# Patient Record
Sex: FEMALE | Race: BLACK OR AFRICAN AMERICAN | Employment: OTHER | URBAN - METROPOLITAN AREA
[De-identification: names, ages, dates, MRNs, and addresses within clinical notes are randomized per-mention and may not be internally consistent; named-entity substitution may affect disease eponyms.]

---

## 2018-05-10 ENCOUNTER — ANESTHESIA EVENT (OUTPATIENT)
Dept: SURGERY | Age: 73
End: 2018-05-10
Payer: MEDICARE

## 2018-05-11 ENCOUNTER — ANESTHESIA (OUTPATIENT)
Dept: SURGERY | Age: 73
End: 2018-05-11
Payer: MEDICARE

## 2018-05-11 ENCOUNTER — HOSPITAL ENCOUNTER (OUTPATIENT)
Age: 73
Setting detail: OUTPATIENT SURGERY
Discharge: HOME | End: 2018-05-11
Attending: ORTHOPAEDIC SURGERY | Admitting: ORTHOPAEDIC SURGERY
Payer: MEDICARE

## 2018-05-11 VITALS
RESPIRATION RATE: 12 BRPM | HEART RATE: 68 BPM | WEIGHT: 172.38 LBS | SYSTOLIC BLOOD PRESSURE: 139 MMHG | DIASTOLIC BLOOD PRESSURE: 56 MMHG | OXYGEN SATURATION: 96 % | TEMPERATURE: 97.6 F | BODY MASS INDEX: 25.46 KG/M2

## 2018-05-11 DIAGNOSIS — G89.18 POST-OP PAIN: Primary | ICD-10-CM

## 2018-05-11 LAB
GLUCOSE BLD STRIP.AUTO-MCNC: 130 MG/DL (ref 65–100)
POTASSIUM BLD-SCNC: 3.8 MMOL/L (ref 3.5–5.1)

## 2018-05-11 PROCEDURE — 77030006605 HC BLD CRPL IN BIOM -C: Performed by: ORTHOPAEDIC SURGERY

## 2018-05-11 PROCEDURE — 74011000250 HC RX REV CODE- 250: Performed by: ORTHOPAEDIC SURGERY

## 2018-05-11 PROCEDURE — 77030000032 HC CUF TRNQT ZIMM -B: Performed by: ORTHOPAEDIC SURGERY

## 2018-05-11 PROCEDURE — 77030039266 HC ADH SKN EXOFIN S2SG -A: Performed by: ORTHOPAEDIC SURGERY

## 2018-05-11 PROCEDURE — 74011250636 HC RX REV CODE- 250/636: Performed by: ORTHOPAEDIC SURGERY

## 2018-05-11 PROCEDURE — 74011250636 HC RX REV CODE- 250/636

## 2018-05-11 PROCEDURE — 77030011640 HC PAD GRND REM COVD -A: Performed by: ORTHOPAEDIC SURGERY

## 2018-05-11 PROCEDURE — 76210000021 HC REC RM PH II 0.5 TO 1 HR: Performed by: ORTHOPAEDIC SURGERY

## 2018-05-11 PROCEDURE — 77030018836 HC SOL IRR NACL ICUM -A: Performed by: ORTHOPAEDIC SURGERY

## 2018-05-11 PROCEDURE — 74011250637 HC RX REV CODE- 250/637: Performed by: ANESTHESIOLOGY

## 2018-05-11 PROCEDURE — 74011250636 HC RX REV CODE- 250/636: Performed by: ANESTHESIOLOGY

## 2018-05-11 PROCEDURE — 76010000138 HC OR TIME 0.5 TO 1 HR: Performed by: ORTHOPAEDIC SURGERY

## 2018-05-11 PROCEDURE — 82962 GLUCOSE BLOOD TEST: CPT

## 2018-05-11 PROCEDURE — 76060000032 HC ANESTHESIA 0.5 TO 1 HR: Performed by: ORTHOPAEDIC SURGERY

## 2018-05-11 PROCEDURE — 76210000063 HC OR PH I REC FIRST 0.5 HR: Performed by: ORTHOPAEDIC SURGERY

## 2018-05-11 PROCEDURE — 77030031139 HC SUT VCRL2 J&J -A: Performed by: ORTHOPAEDIC SURGERY

## 2018-05-11 PROCEDURE — 84132 ASSAY OF SERUM POTASSIUM: CPT

## 2018-05-11 PROCEDURE — 74011000250 HC RX REV CODE- 250

## 2018-05-11 RX ORDER — PROPOFOL 10 MG/ML
INJECTION, EMULSION INTRAVENOUS
Status: DISCONTINUED | OUTPATIENT
Start: 2018-05-11 | End: 2018-05-11 | Stop reason: HOSPADM

## 2018-05-11 RX ORDER — HYDROMORPHONE HYDROCHLORIDE 2 MG/ML
0.5 INJECTION, SOLUTION INTRAMUSCULAR; INTRAVENOUS; SUBCUTANEOUS
Status: DISCONTINUED | OUTPATIENT
Start: 2018-05-11 | End: 2018-05-11 | Stop reason: HOSPADM

## 2018-05-11 RX ORDER — CEFAZOLIN SODIUM/WATER 2 G/20 ML
2 SYRINGE (ML) INTRAVENOUS ONCE
Status: DISCONTINUED | OUTPATIENT
Start: 2018-05-11 | End: 2018-05-11 | Stop reason: HOSPADM

## 2018-05-11 RX ORDER — SODIUM CHLORIDE, SODIUM LACTATE, POTASSIUM CHLORIDE, CALCIUM CHLORIDE 600; 310; 30; 20 MG/100ML; MG/100ML; MG/100ML; MG/100ML
75 INJECTION, SOLUTION INTRAVENOUS CONTINUOUS
Status: DISCONTINUED | OUTPATIENT
Start: 2018-05-11 | End: 2018-05-11 | Stop reason: HOSPADM

## 2018-05-11 RX ORDER — LIDOCAINE HYDROCHLORIDE AND EPINEPHRINE 10; 10 MG/ML; UG/ML
INJECTION, SOLUTION INFILTRATION; PERINEURAL AS NEEDED
Status: DISCONTINUED | OUTPATIENT
Start: 2018-05-11 | End: 2018-05-11 | Stop reason: HOSPADM

## 2018-05-11 RX ORDER — MIDAZOLAM HYDROCHLORIDE 1 MG/ML
2 INJECTION, SOLUTION INTRAMUSCULAR; INTRAVENOUS
Status: DISCONTINUED | OUTPATIENT
Start: 2018-05-11 | End: 2018-05-11 | Stop reason: HOSPADM

## 2018-05-11 RX ORDER — SODIUM CHLORIDE 0.9 % (FLUSH) 0.9 %
5-10 SYRINGE (ML) INJECTION AS NEEDED
Status: DISCONTINUED | OUTPATIENT
Start: 2018-05-11 | End: 2018-05-11 | Stop reason: HOSPADM

## 2018-05-11 RX ORDER — OXYCODONE AND ACETAMINOPHEN 5; 325 MG/1; MG/1
1 TABLET ORAL AS NEEDED
Status: DISCONTINUED | OUTPATIENT
Start: 2018-05-11 | End: 2018-05-11 | Stop reason: HOSPADM

## 2018-05-11 RX ORDER — FAMOTIDINE 20 MG/1
20 TABLET, FILM COATED ORAL ONCE
Status: COMPLETED | OUTPATIENT
Start: 2018-05-11 | End: 2018-05-11

## 2018-05-11 RX ORDER — LIDOCAINE HYDROCHLORIDE 10 MG/ML
INJECTION INFILTRATION; PERINEURAL AS NEEDED
Status: DISCONTINUED | OUTPATIENT
Start: 2018-05-11 | End: 2018-05-11 | Stop reason: HOSPADM

## 2018-05-11 RX ORDER — LIDOCAINE HYDROCHLORIDE 20 MG/ML
INJECTION, SOLUTION EPIDURAL; INFILTRATION; INTRACAUDAL; PERINEURAL AS NEEDED
Status: DISCONTINUED | OUTPATIENT
Start: 2018-05-11 | End: 2018-05-11 | Stop reason: HOSPADM

## 2018-05-11 RX ORDER — FENTANYL CITRATE 50 UG/ML
INJECTION, SOLUTION INTRAMUSCULAR; INTRAVENOUS AS NEEDED
Status: DISCONTINUED | OUTPATIENT
Start: 2018-05-11 | End: 2018-05-11 | Stop reason: HOSPADM

## 2018-05-11 RX ORDER — LIDOCAINE HYDROCHLORIDE 10 MG/ML
0.1 INJECTION INFILTRATION; PERINEURAL AS NEEDED
Status: DISCONTINUED | OUTPATIENT
Start: 2018-05-11 | End: 2018-05-11 | Stop reason: HOSPADM

## 2018-05-11 RX ORDER — LIDOCAINE HYDROCHLORIDE 5 MG/ML
INJECTION, SOLUTION INFILTRATION; INTRAVENOUS AS NEEDED
Status: DISCONTINUED | OUTPATIENT
Start: 2018-05-11 | End: 2018-05-11 | Stop reason: HOSPADM

## 2018-05-11 RX ORDER — METHYLPREDNISOLONE ACETATE 40 MG/ML
INJECTION, SUSPENSION INTRA-ARTICULAR; INTRALESIONAL; INTRAMUSCULAR; SOFT TISSUE AS NEEDED
Status: DISCONTINUED | OUTPATIENT
Start: 2018-05-11 | End: 2018-05-11 | Stop reason: HOSPADM

## 2018-05-11 RX ORDER — PROPOFOL 10 MG/ML
INJECTION, EMULSION INTRAVENOUS AS NEEDED
Status: DISCONTINUED | OUTPATIENT
Start: 2018-05-11 | End: 2018-05-11 | Stop reason: HOSPADM

## 2018-05-11 RX ORDER — NALOXONE HYDROCHLORIDE 0.4 MG/ML
0.2 INJECTION, SOLUTION INTRAMUSCULAR; INTRAVENOUS; SUBCUTANEOUS AS NEEDED
Status: DISCONTINUED | OUTPATIENT
Start: 2018-05-11 | End: 2018-05-11 | Stop reason: HOSPADM

## 2018-05-11 RX ORDER — HYDROCODONE BITARTRATE AND ACETAMINOPHEN 7.5; 325 MG/1; MG/1
1 TABLET ORAL
Qty: 20 TAB | Refills: 0 | Status: SHIPPED | OUTPATIENT
Start: 2018-05-11

## 2018-05-11 RX ADMIN — FENTANYL CITRATE 25 MCG: 50 INJECTION, SOLUTION INTRAMUSCULAR; INTRAVENOUS at 13:36

## 2018-05-11 RX ADMIN — PROPOFOL 20 MG: 10 INJECTION, EMULSION INTRAVENOUS at 13:17

## 2018-05-11 RX ADMIN — PROPOFOL 75 MCG/KG/MIN: 10 INJECTION, EMULSION INTRAVENOUS at 13:13

## 2018-05-11 RX ADMIN — SODIUM CHLORIDE, SODIUM LACTATE, POTASSIUM CHLORIDE, AND CALCIUM CHLORIDE 75 ML/HR: 600; 310; 30; 20 INJECTION, SOLUTION INTRAVENOUS at 12:13

## 2018-05-11 RX ADMIN — FAMOTIDINE 20 MG: 20 TABLET ORAL at 12:13

## 2018-05-11 RX ADMIN — PROPOFOL 30 MG: 10 INJECTION, EMULSION INTRAVENOUS at 13:29

## 2018-05-11 RX ADMIN — LIDOCAINE HYDROCHLORIDE 40 MG: 20 INJECTION, SOLUTION EPIDURAL; INFILTRATION; INTRACAUDAL; PERINEURAL at 13:13

## 2018-05-11 RX ADMIN — MIDAZOLAM HYDROCHLORIDE 2 MG: 1 INJECTION, SOLUTION INTRAMUSCULAR; INTRAVENOUS at 13:04

## 2018-05-11 RX ADMIN — LIDOCAINE HYDROCHLORIDE 30 ML: 5 INJECTION, SOLUTION INFILTRATION; INTRAVENOUS at 13:20

## 2018-05-11 RX ADMIN — PROPOFOL 20 MG: 10 INJECTION, EMULSION INTRAVENOUS at 13:13

## 2018-05-11 NOTE — IP AVS SNAPSHOT
[address] [id number] [address] 
[phone] Patient: [facility] HERMINIA DENNEY MRN: [id number] :1945 About your hospitalization You were admitted on:  May 11, 2018 You last received care in the:  [facility] OP PACU You were discharged on:  May 11, 2018 Why you were hospitalized Your primary diagnosis was:  Not on File Follow-up Information Follow up With Details Comments Contact Info Oneida Christopher MD  May 21st at 1:50pm at the [facility] [place] [address] 
[phone] Discharge Orders None A check malcolm indicates which time of day the medication should be taken. My Medications START taking these medications Instructions Each Dose to Equal  
 Morning Noon Evening Bedtime HYDROcodone-acetaminophen 7.5-325 mg per tablet Commonly known as:  [place] No Your last dose was: Your next dose is: Take 1 Tab by mouth every four (4) hours as needed for Pain. Max Daily Amount: 6 Tabs. 1 Tab CONTINUE taking these medications Instructions Each Dose to Equal  
 Morning Noon Evening Bedtime CeleXA 20 mg tablet Generic drug:  citalopram  
   
Your last dose was: Your next dose is: Take 20 mg by mouth every morning. 20 mg  
    
   
   
   
  
 CRESTOR 20 mg tablet Generic drug:  rosuvastatin Your last dose was: Your next dose is: Take 20 mg by mouth nightly. 20 mg  
    
   
   
   
  
 GLUCOPHAGE 500 mg tablet Generic drug:  metFORMIN Your last dose was: Your next dose is: Take 500 mg by mouth every morning. 500 mg MONOPRIL HCT 10-12.5 mg per tablet Generic drug:  fosinopril-hydroCHLOROthiazide Your last dose was: Your next dose is: Take 1 Tab by mouth every morning. 1 Tab MULTI-VITAMIN HI-PO PO Your last dose was: Your next dose is: Take 1 Tab by mouth every morning. 1 Tab Where to Get Your Medications Information on where to get these meds will be given to you by the nurse or doctor. ! Ask your nurse or doctor about these medications HYDROcodone-acetaminophen 7.5-325 mg per tablet Opioid Education Prescription Opioids: What You Need to Know: 
 
 
3. Ice and elevate the surgical site. 4.  May remove dressings and wash in running water or shower. Then cover the  incisions with Band-aids. Do not submerge in bath or dish water. TYPICAL SIDE EFFECTS OF PAIN MEDICATION: 
*    Constipation: Drink lots of fluids, try prune juice. OTC stool softener if needed. *    Nausea: Take pain medication with food. ACTIVITY · As tolerated and as directed by your doctor. · Bathe or shower as directed by your doctor. DIET 
· Day of surgery: Clear liquids until no nausea or vomiting; small portion, light diet [place] foods (ex: baked chicken, plain rice, grits, scrambled eggs, toast). Nothing greasy, fried or spicy today. · Advance to regular diet on second day, unless your doctor orders otherwise. · If nausea and vomiting continues, call your doctor. PAIN 
· Take pain medication as directed by your doctor. · DO NOT take aspirin or blood thinners unless directed by your doctor. CALL YOUR DOCTOR IF   
s Call your doctor if pain is NOT relieved by medication.  
s Excessive bleeding that does not stop after holding pressure over the area · Temperature of 101 degrees F or above · Excessive redness, swelling or bruising, and/ or green or yellow, smelly discharge from incision AFTER ANESTHESIA · For the first 24 hours: DO NOT Drive, Drink alcoholic beverages, or Make important decisions. · Be aware of dizziness following anesthesia and while taking pain medication. DISCHARGE SUMMARY from Nurse PATIENT INSTRUCTIONS: 
 
After general anesthesia or intravenous sedation, for 24 hours or while taking prescription Narcotics: · Limit your activities · Do not drive and operate hazardous machinery · Do not make important personal or business decisions · Do  not drink alcoholic beverages · If you have not urinated within 8 hours after discharge, please contact your surgeon on call. *  Please give a list of your current medications to your Primary Care Provider. *  Please update this list whenever your medications are discontinued, doses are 
    changed, or new medications (including over-the-counter products) are added. *  Please carry medication information at all times in case of emergency situations. Preventing Infection at Home We care about preventing infection and avoiding the spread of germs  not only when you are in the hospital but also when you return home. When you return home from the hospital, its important to take the following steps to help prevent infection and avoid spreading germs that could infect you and others. Ask everyone in your home to follow these guidelines, too. Clean Your Hands · Clean your hands whenever your hands are visibly dirty, before you eat, before or after touching your mouth, nose or eyes, and before preparing food. Clean them after contact with body fluids, using the restroom, touching animals or changing diapers. · When washing hands, wet them with warm water and work up a lather. Rub hands for at least 15 seconds, then rinse them and pat them dry with a clean towel or paper towel. · When using hand sanitizers, it should take about 15 seconds to rub your hands dry. If not, you probably didnt apply enough . Cover Your Sneeze or Cough Germs are released into the air whenever you sneeze or cough. To prevent the spread of infection: · Turn away from other people before coughing or sneezing. · Cover your mouth or nose with a tissue when you cough or sneeze. Put the tissue in the trash. · If you dont have a tissue, cough or sneeze into your upper sleeve, not your hands. · Always clean your hands after coughing or sneezing. Care for Wounds Your skin is your bodys first line of defense against germs, but an open wound leaves an easy way for germs to enter your body. To prevent infection: · Clean your hands before and after changing wound dressings, and wear gloves to change dressings if recommended by your doctor. · Take special care with IV lines or other devices inserted into the body. If you must touch them, clean your hands first. 
· Follow any specific instructions from your doctor to care for your wounds. Contact your doctor if you experience any signs of infection, such as fever or increased redness at the surgical or wound site. Keep a [place] [address] · Clean or wipe commonly touched hard surfaces like door handles, sinks, tabletops, phones and TV remotes. · Use products labeled disinfectant to kill harmful bacteria and viruses. · Use a clean cloth or paper towel to clean and dry surfaces. Wiping surfaces with a dirty dishcloth, sponge or towel will only spread germs.  
· Never share toothbrushes, urias, drinking glasses, utensils, razor blades, face cloths or bath towels to avoid spreading germs. · Be sure that the linens that you sleep on are clean. · Keep pets away from wounds and wash your hands after touching pets, their toys or bedding. We care about you and your health. Remember, preventing infections is a team effort between you, your family, friends and health care providers. These are general instructions for a healthy lifestyle: No smoking/ No tobacco products/ Avoid exposure to second hand smoke Surgeon General's Warning:  Quitting smoking now greatly reduces serious risk to your health. Obesity, smoking, and sedentary lifestyle greatly increases your risk for illness A healthy diet, regular physical exercise & weight monitoring are important for maintaining a healthy lifestyle You may be retaining fluid if you have a history of heart failure or if you experience any of the following symptoms:  Weight gain of 3 pounds or more overnight or 5 pounds in a week, increased swelling in our hands or feet or shortness of breath while lying flat in bed. Please call your doctor as soon as you notice any of these symptoms; do not wait until your next office visit. Recognize signs and symptoms of STROKE: 
 
F-face looks uneven A-arms unable to move or move unevenly S-speech slurred or non-existent T-time-call 911 as soon as signs and symptoms begin-DO NOT go Back to bed or wait to see if you get better-TIME IS BRAIN. ACO Transitions of Care Introducing [employer] [address] Sobeida Juwan offers a voluntary care coordination program to provide high quality service and care to [facility] fee-for-service beneficiaries. Dany Hernandez was designed to help you enhance your health and well-being through the following services: ? Transitions of Care  support for individuals who are transitioning from one care setting to another (example: Hospital to home). ? Chronic and Complex Care Coordination  support for individuals and caregivers of those with serious or chronic illnesses or with more than one chronic (ongoing) condition and those who take a number of different medications. If you meet specific medical criteria, a [facility] [address] may call you directly to coordinate your care with your primary care physician and your other care providers. For questions about the [facility] programs, please, contact your physicians office. For general questions or additional information about Accountable Care Organizations: 
Please visit www.medicare.gov/acos. html or call 1-800-MEDICARE (0-521.202.7243) TTY users should call 2-223.777.5023. Introducing [facility] & HEALTH SERVICES! Brooke Alva introduces [facility] patient portal. Now you can access parts of your medical record, email your doctor's office, and request medication refills online. 1. In your internet browser, go to [email or web address] 2. Click on the First Time User? Click Here link in the Sign In box. You will see the New Member Sign Up page. 3. Enter your [facility] Access Code exactly as it appears below. You will not need to use this code after youve completed the sign-up process. If you do not sign up before the expiration date, you must request a new code. · [facility] Access Code: [email or web address] Expires: 7/31/2018  9:02 AM 
 
4. Enter the last four digits of your Social Security Number (xxxx) and Date of Birth (mm/dd/yyyy) as indicated and click Submit. You will be taken to the next sign-up page. 5. Create a [employer] ID. This will be your [facility] login ID and cannot be changed, so think of one that is secure and easy to remember. 6. Create a [facility] password. You can change your password at any time. 7. Enter your Password Reset Question and Answer. This can be used at a later time if you forget your password. 8. Enter your e-mail address. You will receive e-mail notification when new information is available in [address] 9. Click Sign Up. You can now view and download portions of your medical record. 10. Click the Download Summary menu link to download a portable copy of your medical information. If you have questions, please visit the Frequently Asked Questions section of the [employer] website. Remember, [employer] is NOT to be used for urgent needs. For medical emergencies, dial 911. Now available from your iPhone and Android! Introducing James Selby As a [facility] patient, I wanted to make you aware of our electronic visit tool called James Selby. [facility] 24/7 allows you to connect within minutes with a medical provider 24 hours a day, seven days a week via a mobile device or tablet or logging into a secure website from your computer. You can access James Selby from anywhere in the [place]. A virtual visit might be right for you when you have a simple condition and feel like you just dont want to get out of bed, or cant get away from work for an appointment, when your regular [facility] provider is not available (evenings, weekends or holidays), or when youre out of town and need minor care. Electronic visits cost only $49 and if the [facility] provider determines a prescription is needed to treat your condition, one can be electronically transmitted to a nearby pharmacy*. Please take a moment to enroll today if you have not already done so. The enrollment process is free and takes just a few minutes. To enroll, please download the [facility] 24/7 belinda to your tablet or phone, or visit [email or web address] to enroll on your computer.    
And, as an [address] [place] [address] patient with a [employer] account, the results of your visits will be scanned into your electronic medical record and your primary care provider will be able to view the scanned results. We urge you to continue to see your regular [facility] provider for your ongoing medical care. And while your primary care provider may not be the one available when you seek a [facility] virtual visit, the peace of mind you get from getting a real diagnosis real time can be priceless. For more information on [facility], view our Frequently Asked Questions (FAQs) at [email or web address]. Sincerely, 
 
Dianah Sicard, MD 
Chief Medical Officer Sari Echeverria *:  certain medications cannot be prescribed via [facility] Providers Seen During Your Hospitalization Provider Specialty Primary office phone Brigitte 75., MD Orthopedic Surgery [phone] Your Primary Care Physician (PCP) Primary Care Physician Office Phone Office Fax Adis Dawsonldi [phone] You are allergic to the following Allergen Reactions Codeine Nausea and Vomiting Recent Documentation Weight BMI OB Status Smoking Status 78.2 kg 25.46 kg/m2 Postmenopausal Former Smoker Emergency Contacts Name Discharge Info Relation Home Work Mobile Sheila Mcdonnell CAREGIVER [3] Daughter [21] [phone] Salma Bautista  Son [22] [phone] Patient Belongings The following personal items are in your possession at time of discharge: 
  Dental Appliances: Other (comment) (dental implant dentures)         Home Medications: None   Jewelry: Necklace  Clothing: Footwear, Pants, Shirt    Other Valuables: None Please provide this summary of care documentation to your next provider. Signatures-by signing, you are acknowledging that this After Visit Summary has been reviewed with you and you have received a copy. Patient Signature:  ____________________________________________________________ Date:  ____________________________________________________________  
  
Brook Oka Provider Signature:  ____________________________________________________________ Date:  ____________________________________________________________

## 2018-05-11 NOTE — ANESTHESIA PREPROCEDURE EVALUATION
Anesthetic History   No history of anesthetic complications            Review of Systems / Medical History  Patient summary reviewed, nursing notes reviewed and pertinent labs reviewed    Pulmonary        Sleep apnea  Smoker         Neuro/Psych              Cardiovascular    Hypertension: well controlled                Comments: Mild MR   GI/Hepatic/Renal     GERD: well controlled           Endo/Other    Diabetes: type 2         Other Findings              Physical Exam    Airway  Mallampati: II  TM Distance: 4 - 6 cm  Neck ROM: normal range of motion   Mouth opening: Normal     Cardiovascular    Rhythm: regular           Dental    Dentition: Implants and Full upper dentures     Pulmonary  Breath sounds clear to auscultation               Abdominal         Other Findings            Anesthetic Plan    ASA: 2  Anesthesia type: regional - [place] block            Anesthetic plan and risks discussed with: Patient

## 2018-05-11 NOTE — OP NOTES
OPERATIVE NOTE    [facility]    5/11/2018    PREOP DIAGNOSIS:  1. LEFT CARPAL TUNNEL SYNDROME       2. LEFT RING AND LITTLE  TRIGGER FINGERS       3. LEFT WRIST PAIN    POSTOP DIAGNOSIS:  1. LEFT  CARPAL TUNNEL SYNDROME          2. LEFT RING AND LITTLE TRIGGER FINGERS          3. LEFT WRIST PAIN    PROCEDURE:  1. LEFT CARPAL TUNNEL RELEASE                  2.  LEFT RING AND LITTLE  TRIGGER FINGER RELEASES       3. STEROID INJECTION OF THE LEFT ULNO-CARPAL JOINT    SURGEON:  Marsha Guillermo MD    ANESTHESIA:  Brooks Block    INDICATIONS:  Van Braga is a 81 y.o. female with CTS and triggering of the ring and little fingers of the left hand. The planned procedure and alternatives for treatment have been discussed previously. Informed consent has been obtained. The operative site has been marked and perioperative Ancef given. PROCEDURAL NOTE:   The patient was brought to the OR and anesthetized for the procedure. The patient was positioned in the supine position and bony prominences and nerves padded or protected. The left upper extremity was prepped and draped as a sterile field. A time out was held with the operative team and the patient, procedure, surgical site and surgeon identified. The ulno carpal joint was injected with 1 cc of 1%lidocaine and 40 mg of DepoMedrol. A slightly curved longitudinal incision was then made in the proximal palm. Dissection was carried down to the subcutaneous tissue. A small self retaining retractor was inserted and the palmar fascia opened with the tip of the scalpel. The transverse ligament was incised over 5-6 mm at the distal margin and the wrist then moderately extended over a folded towel.,  The Canonical system was used to complete the release. First the single  was passed deep to the deep ligament into the carpal canal.  This was followed by the stripper instrument and then the double .   Finally the ligamentatome was used to release the ligament from distal to proximal with a single smooth pass. The completeness of the release was checked by palpating with the single  as well as visually. The median nerve was intact and no masses or other abnormalities noted. Small fascial bands distally were released with the tenotomy scissors. The margins of the canal were injected with 1% Lidocaine. The incision was closed with 4-0 Vicryl Rapide. A separate 1.5 cm transverse incision was then made over the A1 pulley at the base of the left ring and little fingers. Michelle Peaks Spreading dissection was used to expose the flexor tendon sheaths. Each sheath was opened with the tip of the scalpal initially and then the tenotomy scissors were used to open the A1 pulley distally and the tenosynovium proximally. The flexor digitorum superficialis and flexor digitorum profundus were inspected. There was longitudinal fraying of the FDS tendon, worse with the little finger. . There was no evidence of any further triggering. The areas around the incisions were infiltrated with 1% Lidocaine. The incisions were closed withsubcuticular stitches of 4-0 Vicryl Rapide. Both incisions were reenforced with surgical adhesive and a sterile dressing applied. The pt was sent to the recovery room in good condition.      TOURNIQUET TIME:   Total Tourniquet Time Documented:  Forearm (Left) - 34 minutes  Total: Forearm (Left) - 34 minutes      SIGNED: Carlos Ramirez MD

## 2018-05-11 NOTE — DISCHARGE INSTRUCTIONS
POST OP INSTRUCTIONS      1. Patient has appointment for 5/21/18 at 1:50 PM at the [place] [address] office. 2.  For problems call Dr Tracy Jeffery, [facility],  [phone]          Appointments only,  [phone]    3. Ice and elevate the surgical site. 4.  May remove dressings and wash in running water or shower. Then cover the  incisions with Band-aids. Do not submerge in bath or dish water. TYPICAL SIDE EFFECTS OF PAIN MEDICATION:  *    Constipation: Drink lots of fluids, try prune juice. OTC stool softener if needed. *    Nausea: Take pain medication with food. ACTIVITY  · As tolerated and as directed by your doctor. · Bathe or shower as directed by your doctor. DIET  · Day of surgery: Clear liquids until no nausea or vomiting; small portion, light diet [place] foods (ex: baked chicken, plain rice, grits, scrambled eggs, toast). Nothing greasy, fried or spicy today. · Advance to regular diet on second day, unless your doctor orders otherwise. · If nausea and vomiting continues, call your doctor. PAIN  · Take pain medication as directed by your doctor. · DO NOT take aspirin or blood thinners unless directed by your doctor. CALL YOUR DOCTOR IF    s Call your doctor if pain is NOT relieved by medication.   s Excessive bleeding that does not stop after holding pressure over the area  · Temperature of 101 degrees F or above  · Excessive redness, swelling or bruising, and/ or green or yellow, smelly discharge from incision    AFTER ANESTHESIA   · For the first 24 hours: DO NOT Drive, Drink alcoholic beverages, or Make important decisions. · Be aware of dizziness following anesthesia and while taking pain medication.        DISCHARGE SUMMARY from Nurse    PATIENT INSTRUCTIONS:    After general anesthesia or intravenous sedation, for 24 hours or while taking prescription Narcotics:  · Limit your activities  · Do not drive and operate hazardous machinery  · Do not make important personal or business decisions  · Do  not drink alcoholic beverages  · If you have not urinated within 8 hours after discharge, please contact your surgeon on call. *  Please give a list of your current medications to your Primary Care Provider. *  Please update this list whenever your medications are discontinued, doses are      changed, or new medications (including over-the-counter products) are added. *  Please carry medication information at all times in case of emergency situations. Preventing Infection at Home  We care about preventing infection and avoiding the spread of germs - not only when you are in the hospital but also when you return home. When you return home from the hospital, its important to take the following steps to help prevent infection and avoid spreading germs that could infect you and others. Ask everyone in your home to follow these guidelines, too. Clean Your Hands  · Clean your hands whenever your hands are visibly dirty, before you eat, before or after touching your mouth, nose or eyes, and before preparing food. Clean them after contact with body fluids, using the restroom, touching animals or changing diapers. · When washing hands, wet them with warm water and work up a lather. Rub hands for at least 15 seconds, then rinse them and pat them dry with a clean towel or paper towel. · When using hand sanitizers, it should take about 15 seconds to rub your hands dry. If not, you probably didnt apply enough . Cover Your Sneeze or Cough  Germs are released into the air whenever you sneeze or cough. To prevent the spread of infection:  · Turn away from other people before coughing or sneezing. · Cover your mouth or nose with a tissue when you cough or sneeze. Put the tissue in the trash. · If you dont have a tissue, cough or sneeze into your upper sleeve, not your hands.   · Always clean your hands after coughing or sneezing. Care for Wounds  Your skin is your bodys first line of defense against germs, but an open wound leaves an easy way for germs to enter your body. To prevent infection:  · Clean your hands before and after changing wound dressings, and wear gloves to change dressings if recommended by your doctor. · Take special care with IV lines or other devices inserted into the body. If you must touch them, clean your hands first.  · Follow any specific instructions from your doctor to care for your wounds. Contact your doctor if you experience any signs of infection, such as fever or increased redness at the surgical or wound site. Keep a Clean Home  · Clean or wipe commonly touched hard surfaces like door handles, sinks, tabletops, phones and TV remotes. · Use products labeled disinfectant to kill harmful bacteria and viruses. · Use a clean cloth or paper towel to clean and dry surfaces. Wiping surfaces with a dirty dishcloth, sponge or towel will only spread germs. · Never share toothbrushes, urias, drinking glasses, utensils, razor blades, face cloths or bath towels to avoid spreading germs. · Be sure that the linens that you sleep on are clean. · Keep pets away from wounds and wash your hands after touching pets, their toys or bedding. We care about you and your health. Remember, preventing infections is a team effort between you, your family, friends and health care providers. These are general instructions for a healthy lifestyle:    No smoking/ No tobacco products/ Avoid exposure to second hand smoke    Surgeon General's Warning:  Quitting smoking now greatly reduces serious risk to your health.     Obesity, smoking, and sedentary lifestyle greatly increases your risk for illness    A healthy diet, regular physical exercise & weight monitoring are important for maintaining a healthy lifestyle    You may be retaining fluid if you have a history of heart failure or if you experience any of the following symptoms:  Weight gain of 3 pounds or more overnight or 5 pounds in a week, increased swelling in our hands or feet or shortness of breath while lying flat in bed. Please call your doctor as soon as you notice any of these symptoms; do not wait until your next office visit. Recognize signs and symptoms of STROKE:    F-face looks uneven    A-arms unable to move or move unevenly    S-speech slurred or non-existent    T-time-call 911 as soon as signs and symptoms begin-DO NOT go       Back to bed or wait to see if you get better-TIME IS BRAIN.

## 2018-05-11 NOTE — ANESTHESIA PROCEDURE NOTES
Peripheral Block    Start time: 5/11/2018 1:16 PM  End time: 5/11/2018 1:22 PM  Performed by: Jose Elias Clark  Authorized by: Rima Paulino       Pre-procedure: Indications: at surgeon's request and primary anesthetic    Preanesthetic Checklist: patient identified, risks and benefits discussed, site marked, timeout performed, anesthesia consent given and patient being monitored    Timeout Time: 13:15          Block Type:   Block Type:  Juma block  Patient Position:  Supine  Block Limb IV Checked: Yes    Esmarch exsanguination: Yes    Tourniquet Type:  Single  Tourniquet Location:  Below elbow  Tourniquet Inflated:  5/11/2018 1:18 PM  Inflation (mmHg):  250  Limb w/o Radial Pulse: Yes    Infused Agent:  Lidocaine 0.5%  Volume Infused (mL):  30  :  Stable WNL,    Assessment:    Injection Assessment:   Patient tolerance:  Patient tolerated the procedure well with no immediate complications  Performed  By Bernice Zamora CRNA

## 2018-05-11 NOTE — PERIOP NOTES
Pt discharged home with rx x1 ([facility]), wound care instructions and follow up appointment. Verbal understanding of all instructions by patient and daughter (Toya). All questions answered prior to discharge. All belongings taken with pt. Pt taken to car via wheelchair by this RN.

## 2018-05-11 NOTE — ANESTHESIA POSTPROCEDURE EVALUATION
Post-Anesthesia Evaluation and Assessment    Patient: Álvaro Avitia MRN: [id number]  SSN: [id number]    YOB: 1945  Age: 81 y.o. Sex: female       Cardiovascular Function/Vital Signs  Visit Vitals    /50    Pulse 71    Temp 36.4 °C (97.6 °F)    Resp 14    Wt 78.2 kg (172 lb 6 oz)    SpO2 96%    BMI 25.46 kg/m2       Patient is status post regional anesthesia for Procedure(s):  HAND CARPAL TUNNEL RELEASE LEFT  FINGER TRIGGER RELEASE LEFT LITTLE AND RING  STEROID INJECTION LEFT WRIST. Nausea/Vomiting: None    Postoperative hydration reviewed and adequate. Pain:  Pain Scale 1: Numeric (0 - 10) (05/11/18 1415)  Pain Intensity 1: 0 (05/11/18 1415)   Managed    Neurological Status:   Neuro (WDL): Exceptions to WDL (05/11/18 1401)  Neuro  Neurologic State: Drowsy (05/11/18 1401)   At baseline    Mental Status and Level of Consciousness: Arousable    Pulmonary Status:   O2 Device: Room air (05/11/18 1415)   Adequate oxygenation and airway patent    Complications related to anesthesia: None    Post-anesthesia assessment completed.  No concerns    Signed By: Aracely Hardin MD     May 11, 2018

## 2018-07-16 ENCOUNTER — HOSPITAL ENCOUNTER (OUTPATIENT)
Dept: PHYSICAL THERAPY | Age: 73
Discharge: HOME | End: 2018-07-16
Payer: MEDICARE

## 2018-07-16 PROCEDURE — 97110 THERAPEUTIC EXERCISES: CPT

## 2018-07-16 PROCEDURE — G8984 CARRY CURRENT STATUS: HCPCS

## 2018-07-16 PROCEDURE — 97022 WHIRLPOOL THERAPY: CPT

## 2018-07-16 PROCEDURE — G8985 CARRY GOAL STATUS: HCPCS

## 2018-07-16 PROCEDURE — 97165 OT EVAL LOW COMPLEX 30 MIN: CPT

## 2018-07-17 NOTE — THERAPY EVALUATION
Jorgito Lyn : 1945 [place] Secondary:  [facility] [place] [address] [facility], [address], [place] [address]. [phone]   [phone] OUTPATIENT OCCUPATIONAL THERAPY: Initial Assessment 2018 ICD-10: Treatment Diagnosis: Pain in left hand (M79.642)Stiffness of left hand, not elsewhere classified (M25.642) Precautions/Allergies:  
Codeine Fall Risk Score: 0 (? 5 = High Risk) MD Orders: Evaluate and treat MEDICAL/REFERRING DIAGNOSIS:  
hand DATE OF ONSET: several months ago DATE OF SURGERY: May 11 2018 REFERRING PHYSICIAN: Mode Kennedy MD 
RETURN PHYSICIAN APPOINTMENT: 4 weeks INITIAL ASSESSMENT:  Ms. Diane Pierre presents with decreased functional use, strength and range of motion of her left hand and upper extremity that is affecting her independence with activities of daily living and ability to perform job tasks. I feel that Ms. Lyn will benefit from skilled occupational therapy to maximize the functional use of her hand and upper extremity in daily activities. PLAN OF CARE:  
PROBLEM LIST: 
1. Pain in left hand. 2. Decreased motion in left hand. 3. Decreased strength in left hand. INTERVENTIONS PLANNED: 
1. Modalities that may include fluidotherapy, paraffin, ultrasound, and light therapy. 2. Therapeutic exercise including a home exercise program. 
3. Manual therapy. 4. Therapeutic activities. TREATMENT PLAN: 
Effective Dates: 2018 TO 10/15/2018 (90 days). Frequency/Duration: 1-2 times a week for 90 Days GOALS: (Goals have been discussed and agreed upon with patient.) Short-Term Functional Goals: Time Frame: 4 weeks 1. Decrease pain to 4 to allow patient to perform self care tasks. 2. Increase motion in left hand by 10 degrees to improve functional use of upper extremity in ADL activities.  
3. Increase strength in left hand by 5 pounds to allow patient to  and lift objects during self care activities. Discharge Goals: Time Frame: 12 weeks 1. Decrease pain to 1 to allow patient to perform all household  tasks. 2. Increase motion in left hand by 20 degreees to allow patient to perform all ADL activities. 3. Increase strength in left hand by 10 pounds to allow patient to , lift, hold, and carry heavy objects. Rehabilitation Potential For Stated Goals: Good Regarding Jose M Lyn's therapy, I certify that the treatment plan above will be carried out by a therapist or under their direction. Thank you for this referral, Brady Elder OT Referring Physician Signature: Jeffery Poon MD _________________________  Date _________ The information in this section was collected on 7/16/2018 (except where otherwise noted). OCCUPATIONAL PROFILE & HISTORY:  
History of Present Injury/Illness (Reason for Referral): The patient had had symptoms of CT and trigger finger for several months. Past Medical History/Comorbidities: Ms. Howie Freedman  has a past medical history of Arrhythmia; Diabetes ([address] (2003); GERD (gastroesophageal reflux disease); Hypercholesteremia; Hypertension; Osteopenia; Psychiatric disorder; Smoker; Unspecified adverse effect of anesthesia; Unspecified sleep apnea; and Wrist fracture, right (12/16/2010). She also has no past medical history of Difficult intubation; Malignant hyperthermia due to anesthesia; Nausea & vomiting; or Pseudocholinesterase deficiency. Ms. Howie Freedman  has a past surgical history that includes hx orthopaedic (2005); hx heent (1998, 1996); hx lap cholecystectomy (2004); and hx tubal ligation (age35). Social History/Living Environment:  
Home Environment: Private residence Prior Level of Function/Work/Activity: 
independent Dominant Side:  
      RIGHT Current Medications:   
Current Outpatient Prescriptions:  
  HYDROcodone-acetaminophen (NORCO) 7.5-325 mg per tablet, Take 1 Tab by mouth every four (4) hours as needed for Pain. Max Daily Amount: 6 Tabs., Disp: 20 Tab, Rfl: 0 
  metformin (GLUCOPHAGE) 500 mg tablet, Take 500 mg by mouth every morning., Disp: , Rfl:  
  rosuvastatin (CRESTOR) 20 mg tablet, Take 20 mg by mouth nightly., Disp: , Rfl:  
  citalopram (CELEXA) 20 mg tablet, Take 20 mg by mouth every morning., Disp: , Rfl:  
  MULTI-VITAMIN HI-PO PO, Take 1 Tab by mouth every morning., Disp: , Rfl:  
  fosinopril-hydrochlorothiazide (MONOPRIL HCT) 10-12.5 mg per tablet, Take 1 Tab by mouth every morning., Disp: , Rfl:   
Date Last Reviewed:  7/16/2018 Number of medical conditions (excluding presenting problem) that affect the Plan of Care: Brief history (0):  LOW COMPLEXITY ASSESSMENT OF OCCUPATIONAL PERFORMANCE:  
RANGE OF MOTION:    
· AROM: Left ring finger motion is as follows: MP 0/75, PIP 0/100 , DIP 0/52 degrees. ·                                                         Little : MP 0/75, PIP -10/100, DIP 0/65 degrees. · The patient's left wrist flexion is limited: 25 degrees. STRENGTH:   STRENGTH: Right 54 lbs. Left 30 lbs. LAT PINCH: Right 12 lbs. Left 10 lbs. 3 JAW EVGENY: Right 10 lbs. Left 6 lbs. SENSATION:  The patient reports constant numbness in her left ring finger. Physical Skills Involved: 1. Range of Motion 2. Strength 3. Sensation 4. Pain (acute) 5. Edema Cognitive Skills Affected (resulting in the inability to perform in a timely and safe manner): 1. none Psychosocial Skills Affected: 1. none Number of elements that affect the Plan of Care: 5+:  HIGH COMPLEXITY CLINICAL DECISION MAKING:  
Outcome Measure: Tool Used: Disabilities of the Arm, Shoulder and Hand (DASH) Questionnaire - Quick Version Score:  Initial: 34/55  Most Recent: X/55 (Date: -- ) Interpretation of Score: The DASH is designed to measure the activities of daily living in person's with upper extremity dysfunction or pain.   Each section is scored on a 1-5 scale, 5 representing the greatest disability. The scores of each section are added together for a total score of 55. Score 11 12-19 20-28 29-37 38-45 46-54 55 Modifier CH CI CJ CK CL CM CN ? Carrying, Moving, and Handling Objects:  
  - CURRENT STATUS: CK - 40%-59% impaired, limited or restricted  - GOAL STATUS: CJ - 20%-39% impaired, limited or restricted  - D/C STATUS:  ---------------To be determined--------------- Medical Necessity:  
· Patient is expected to demonstrate progress in strength and range of motion to increase independence with ADL and household activities. Akua Corey Reason for Services/Other Comments: · The patient has limited motion, strength and function in her left hand. Akua Corey Clinical Decision-Making Assessment:    
Clinical Decision-Making: LOW COMPLEXITY  
TREATMENT:  
(In addition to Assessment/Re-Assessment sessions the following treatments were rendered) Pre-treatment Symptoms/Complaints:  Pain and stiffness in left hand . Pain: Initial:  
Pain Intensity 1: 2 Pain Location 1: Hand Pain Orientation 1: Left  Post Session:  2 Patient recieved a dry whirlpool ( 15 minutes) followed by instruction in a home exercise program. ( 15 minutes ) Treatment/Session Assessment:   
· Response to Treatment:  Patients tolerated treatment well with no complications. Upon completion of treatment, skin condition was normal.. · Compliance with Program/Exercises: Will assess as treatment progresses. · Recommendations/Intent for next treatment session: \"Next visit will focus on advancements to more challenging activities\". Total Treatment Duration: OT Patient Time In/Time Out Time In: 0300 Time Out: 0155 Juanita Doe OT

## 2018-07-17 NOTE — PROGRESS NOTES
Ambulatory/Rehab Services H2 Model Falls Risk Assessment Risk Factor Pts. ·  
Confusion/Disorientation/Impulsivity  []    4 · Symptomatic Depression  []   2 · Altered Elimination  []   1 · Dizziness/Vertigo  []   1 · Gender (Male)  []   1 · Any administered antiepileptics (anticonvulsants):  []   2 · Any administered benzodiazepines:  []   1 · Visual Impairment (specify):  []   1 · Portable Oxygen Use  []   1 · Orthostatic ? BP  []   1 · History of Recent Falls (within 3 mos.)  []   5 Ability to Rise from Chair (choose one) Pts. ·  
Ability to rise in a single movement  [x]   0 · Pushes up, successful in one attempt  []   1 · Multiple attempts, but successful  []   3 · Unable to rise without assistance  []   4 Total: (5 or greater = High Risk) 0 Falls Prevention Plan: 
 []                Physical Limitations to Exercise (specify): 
 []                Mobility Assistance Device (type): 
 []                Exercise/Equipment Adaptation (specify): 
 
©2010 [facility] [address] Patent #[id number]. Federal Law prohibits the replication, distribution or use without written permission from [facility] [employer]

## 2018-07-18 ENCOUNTER — HOSPITAL ENCOUNTER (OUTPATIENT)
Dept: PHYSICAL THERAPY | Age: 73
Discharge: HOME | End: 2018-07-18
Payer: MEDICARE

## 2018-07-18 PROCEDURE — 97018 PARAFFIN BATH THERAPY: CPT

## 2018-07-18 PROCEDURE — 97110 THERAPEUTIC EXERCISES: CPT

## 2018-07-18 PROCEDURE — 97140 MANUAL THERAPY 1/> REGIONS: CPT

## 2018-07-18 NOTE — PROGRESS NOTES
Janelle Lyn : 1945 [place] Secondary:  [facility] [place] [id number] [facility], [address], [place] [address]. [phone]   [phone] OUTPATIENT OCCUPATIONAL THERAPY: Daily Note 2018 ICD-10: Treatment Diagnosis: Pain in left hand (M79.642)Stiffness of left hand, not elsewhere classified (M25.642) Precautions/Allergies:  
Codeine Fall Risk Score: 0 (? 5 = High Risk) MD Orders: Evaluate and treat MEDICAL/REFERRING DIAGNOSIS:  
hand DATE OF ONSET: several months ago DATE OF SURGERY: May 11 2018 REFERRING PHYSICIAN: Brigette Mora MD 
RETURN PHYSICIAN APPOINTMENT: 4 weeks INITIAL ASSESSMENT:  Ms. Harriet Lopez presents with decreased functional use, strength and range of motion of her left hand and upper extremity that is affecting her independence with activities of daily living and ability to perform job tasks. I feel that Ms. Lyn will benefit from skilled occupational therapy to maximize the functional use of her hand and upper extremity in daily activities. PLAN OF CARE:  
PROBLEM LIST: 
1. Pain in left hand. 2. Decreased motion in left hand. 3. Decreased strength in left hand. INTERVENTIONS PLANNED: 
1. Modalities that may include fluidotherapy, paraffin, ultrasound, and light therapy. 2. Therapeutic exercise including a home exercise program. 
3. Manual therapy. 4. Therapeutic activities. TREATMENT PLAN: 
Effective Dates: 2018 TO 10/15/2018 (90 days). Frequency/Duration: 1-2 times a week for 90 Days GOALS: (Goals have been discussed and agreed upon with patient.) Short-Term Functional Goals: Time Frame: 4 weeks 1. Decrease pain to 4 to allow patient to perform self care tasks. 2. Increase motion in left hand by 10 degrees to improve functional use of upper extremity in ADL activities.  
3. Increase strength in left hand by 5 pounds to allow patient to  and lift objects during self care activities. Discharge Goals: Time Frame: 12 weeks 1. Decrease pain to 1 to allow patient to perform all household  tasks. 2. Increase motion in left hand by 20 degreees to allow patient to perform all ADL activities. 3. Increase strength in left hand by 10 pounds to allow patient to , lift, hold, and carry heavy objects. Rehabilitation Potential For Stated Goals: Good Regarding Anna Lyn's therapy, I certify that the treatment plan above will be carried out by a therapist or under their direction. Thank you for this referral, Jeison Jeffrey OT Referring Physician Signature: Dalton Lopes MD _________________________  Date _________ The information in this section was collected on 7/16/2018 (except where otherwise noted). OCCUPATIONAL PROFILE & HISTORY:  
History of Present Injury/Illness (Reason for Referral): The patient had had symptoms of CT and trigger finger for several months. Past Medical History/Comorbidities: Ms. Annette Crow  has a past medical history of Arrhythmia; Diabetes ([facility] [address] (2003); GERD (gastroesophageal reflux disease); Hypercholesteremia; Hypertension; Osteopenia; Psychiatric disorder; Smoker; Unspecified adverse effect of anesthesia; Unspecified sleep apnea; and Wrist fracture, right (12/16/2010). She also has no past medical history of Difficult intubation; Malignant hyperthermia due to anesthesia; Nausea & vomiting; or Pseudocholinesterase deficiency. Ms. Annette Crow  has a past surgical history that includes hx orthopaedic (2005); hx heent (1998, 1996); hx lap cholecystectomy (2004); and hx tubal ligation (age35). Social History/Living Environment:  
  
Prior Level of Function/Work/Activity: 
independent Dominant Side:  
      RIGHT Current Medications:   
Current Outpatient Prescriptions:  
  HYDROcodone-acetaminophen (NORCO) 7.5-325 mg per tablet, Take 1 Tab by mouth every four (4) hours as needed for Pain.  Max Daily Amount: 6 Tabs., Disp: 20 Tab, Rfl: 0 
  metformin (GLUCOPHAGE) 500 mg tablet, Take 500 mg by mouth every morning., Disp: , Rfl:  
  rosuvastatin (CRESTOR) 20 mg tablet, Take 20 mg by mouth nightly., Disp: , Rfl:  
  citalopram (CELEXA) 20 mg tablet, Take 20 mg by mouth every morning., Disp: , Rfl:  
  MULTI-VITAMIN HI-PO PO, Take 1 Tab by mouth every morning., Disp: , Rfl:  
  fosinopril-hydrochlorothiazide (MONOPRIL HCT) 10-12.5 mg per tablet, Take 1 Tab by mouth every morning., Disp: , Rfl:   
Date Last Reviewed:  7/16/2018 Number of medical conditions (excluding presenting problem) that affect the Plan of Care: Brief history (0):  LOW COMPLEXITY ASSESSMENT OF OCCUPATIONAL PERFORMANCE:  
RANGE OF MOTION:    
· AROM: Left ring finger motion is as follows: MP 0/75, PIP 0/100 , DIP 0/52 degrees. ·                                                         Little : MP 0/75, PIP -10/100, DIP 0/65 degrees. · The patient's left wrist flexion is limited: 25 degrees. STRENGTH:   STRENGTH: Right 54 lbs. Left 30 lbs. LAT PINCH: Right 12 lbs. Left 10 lbs. 3 JAW EVGENY: Right 10 lbs. Left 6 lbs. SENSATION:  The patient reports constant numbness in her left ring finger. Physical Skills Involved: 1. Range of Motion 2. Strength 3. Sensation 4. Pain (acute) 5. Edema Cognitive Skills Affected (resulting in the inability to perform in a timely and safe manner): 1. none Psychosocial Skills Affected: 1. none Number of elements that affect the Plan of Care: 5+:  HIGH COMPLEXITY CLINICAL DECISION MAKING:  
Outcome Measure: Tool Used: Disabilities of the Arm, Shoulder and Hand (DASH) Questionnaire - Quick Version Score:  Initial: 34/55  Most Recent: X/55 (Date: -- ) Interpretation of Score: The DASH is designed to measure the activities of daily living in person's with upper extremity dysfunction or pain.   Each section is scored on a 1-5 scale, 5 representing the greatest disability. The scores of each section are added together for a total score of 55. Score 11 12-19 20-28 29-37 38-45 46-54 55 Modifier CH CI CJ CK CL CM CN ? Carrying, Moving, and Handling Objects:  
  - CURRENT STATUS: CK - 40%-59% impaired, limited or restricted  - GOAL STATUS: CJ - 20%-39% impaired, limited or restricted  - D/C STATUS:  ---------------To be determined--------------- Medical Necessity:  
· Patient is expected to demonstrate progress in strength and range of motion to increase independence with ADL and household activities. Filemon Elliott Reason for Services/Other Comments: · The patient has limited motion, strength and function in her left hand. Filemon Elliott Clinical Decision-Making Assessment:    
Clinical Decision-Making: LOW COMPLEXITY  
TREATMENT:  
(In addition to Assessment/Re-Assessment sessions the following treatments were rendered) Pre-treatment Symptoms/Complaints:  Pain and stiffness in left hand . Pain: Initial:  
Pain Intensity 1: 6 Pain Location 1: Hand Pain Orientation 1: Left  Post Session:  3 Patient recieved a dry whirlpool ( 15 minutes) followed by instruction in a home exercise program. ( 15 minutes ) Patient stated \"My hand really still hurts. \" 
 
Manual Therapy: (Soft Tissue Mobilization Duration Duration: 15 Minutes Duration: 15 Minutes): Technique: Retrograde massage (folowed by PROM) Tissue Mobilized: Scar/adhesion LUE Soft Tissue Mobilization: Yes Technique: Retrograde massage Tissue Mobilized: Scar/adhesion Therapeutic Exercise:                                                                               : The patient's home exercise program was reviewed. Date: 
7/18/18 Date: Date: Date: Date: Activity/Exercise Parameters Parameters Parameters Parameters Parameters AROM during Fluidotherapy 20 min Paraffin with Stretch 15 min Retrograde massage, Friction Scar massage, Joint Mobilization 15 min Scarf Curl 5 min Washer Game 5 min Individual Gripper 20 reps Hand [place] 20 reps Cones Pegs Clothes Pins 25 reps A-R [employer] Jaye Gather 40 reps Velcro-Roll A&PROM EX 5 min RESISTIVE EXERCISES Weight/ Sets/Reps Weight/ Sets/Reps Weight/ Sets/Reps Weight/ Sets/Reps Weight/ Sets/Reps WEIGHT WELL Sup/Pro       
UD/RD Wrist Flex/Ext Free Weights UBE(Minutes) Nautilus Compound Row Vertical Chest       
[employer] HEP: As above; handouts given to patient for all exercises. Therapeutic Modalities:  
      Left Wrist Heat Type: Paraffin bath Duration: 15 minutes Patient Position: Sitting Joint Mobilization:     
 
Treatment Times: · Therapeutic Exercise: 30 minutes · Manual Therapy: 15 minutes · Parafin: 15 minutes · Whirlpool:  minutes · Other:  minutes Treatment/Session Assessment:   
· Response to Treatment:  Patients tolerated treatment well with no complications. Upon completion of treatment, skin condition was normal.. · Compliance with Program/Exercises: Will assess as treatment progresses. · Recommendations/Intent for next treatment session: \"Next visit will focus on advancements to more challenging activities\". Will continue per MD. Total Treatment Duration: OT Patient Time In/Time Out Time In: 0200 Time Out: 0300 Mariah Smith OT

## 2018-07-23 ENCOUNTER — HOSPITAL ENCOUNTER (OUTPATIENT)
Dept: PHYSICAL THERAPY | Age: 73
Discharge: HOME | End: 2018-07-23
Payer: MEDICARE

## 2018-07-23 PROCEDURE — 97110 THERAPEUTIC EXERCISES: CPT

## 2018-07-23 PROCEDURE — 97140 MANUAL THERAPY 1/> REGIONS: CPT

## 2018-07-23 PROCEDURE — 97018 PARAFFIN BATH THERAPY: CPT

## 2018-07-23 NOTE — PROGRESS NOTES
Gissel Lyn : 1945 [place] Secondary:  [facility] [place] [id number] [facility], [address], [place] [address]. [phone]   [phone] OUTPATIENT OCCUPATIONAL THERAPY: Daily Note 2018 ICD-10: Treatment Diagnosis: Pain in left hand (M79.642)Stiffness of left hand, not elsewhere classified (M25.642) Precautions/Allergies:  
Codeine Fall Risk Score: 0 (? 5 = High Risk) MD Orders: Evaluate and treat MEDICAL/REFERRING DIAGNOSIS:  
hand DATE OF ONSET: several months ago DATE OF SURGERY: May 11 2018 REFERRING PHYSICIAN: Modesto Dominique MD 
RETURN PHYSICIAN APPOINTMENT: 4 weeks INITIAL ASSESSMENT:  Ms. Myron Moseley presents with decreased functional use, strength and range of motion of her left hand and upper extremity that is affecting her independence with activities of daily living and ability to perform job tasks. I feel that Ms. Lyn will benefit from skilled occupational therapy to maximize the functional use of her hand and upper extremity in daily activities. PLAN OF CARE:  
PROBLEM LIST: 
1. Pain in left hand. 2. Decreased motion in left hand. 3. Decreased strength in left hand. INTERVENTIONS PLANNED: 
1. Modalities that may include fluidotherapy, paraffin, ultrasound, and light therapy. 2. Therapeutic exercise including a home exercise program. 
3. Manual therapy. 4. Therapeutic activities. TREATMENT PLAN: 
Effective Dates: 2018 TO 10/15/2018 (90 days). Frequency/Duration: 1-2 times a week for 90 Days GOALS: (Goals have been discussed and agreed upon with patient.) Short-Term Functional Goals: Time Frame: 4 weeks 1. Decrease pain to 4 to allow patient to perform self care tasks. 2. Increase motion in left hand by 10 degrees to improve functional use of upper extremity in ADL activities.  
3. Increase strength in left hand by 5 pounds to allow patient to  and lift objects during self care activities. Discharge Goals: Time Frame: 12 weeks 1. Decrease pain to 1 to allow patient to perform all household  tasks. 2. Increase motion in left hand by 20 degreees to allow patient to perform all ADL activities. 3. Increase strength in left hand by 10 pounds to allow patient to , lift, hold, and carry heavy objects. Rehabilitation Potential For Stated Goals: Good Regarding Anat Lyn's therapy, I certify that the treatment plan above will be carried out by a therapist or under their direction. Thank you for this referral, Shalom Zaman OT Referring Physician Signature: Baljinder Ruiz MD _________________________  Date _________ The information in this section was collected on 7/16/2018 (except where otherwise noted). OCCUPATIONAL PROFILE & HISTORY:  
History of Present Injury/Illness (Reason for Referral): The patient had had symptoms of CT and trigger finger for several months. Past Medical History/Comorbidities: Ms. Marla Michaels  has a past medical history of Arrhythmia; Diabetes ([address] (2003); GERD (gastroesophageal reflux disease); Hypercholesteremia; Hypertension; Osteopenia; Psychiatric disorder; Smoker; Unspecified adverse effect of anesthesia; Unspecified sleep apnea; and Wrist fracture, right (12/16/2010). She also has no past medical history of Difficult intubation; Malignant hyperthermia due to anesthesia; Nausea & vomiting; or Pseudocholinesterase deficiency. Ms. Marla Michaels  has a past surgical history that includes hx orthopaedic (2005); hx heent (1998, 1996); hx lap cholecystectomy (2004); and hx tubal ligation (age35). Social History/Living Environment:  
  
Prior Level of Function/Work/Activity: 
independent Dominant Side:  
      RIGHT Current Medications:   
Current Outpatient Prescriptions:  
  HYDROcodone-acetaminophen (NORCO) 7.5-325 mg per tablet, Take 1 Tab by mouth every four (4) hours as needed for Pain.  Max Daily Amount: 6 Tabs., Disp: 20 Tab, Rfl: 0 
  metformin (GLUCOPHAGE) 500 mg tablet, Take 500 mg by mouth every morning., Disp: , Rfl:  
  rosuvastatin (CRESTOR) 20 mg tablet, Take 20 mg by mouth nightly., Disp: , Rfl:  
  citalopram (CELEXA) 20 mg tablet, Take 20 mg by mouth every morning., Disp: , Rfl:  
  MULTI-VITAMIN HI-PO PO, Take 1 Tab by mouth every morning., Disp: , Rfl:  
  fosinopril-hydrochlorothiazide (MONOPRIL HCT) 10-12.5 mg per tablet, Take 1 Tab by mouth every morning., Disp: , Rfl:   
Date Last Reviewed: 7/23/2018 Number of medical conditions (excluding presenting problem) that affect the Plan of Care: Brief history (0):  LOW COMPLEXITY ASSESSMENT OF OCCUPATIONAL PERFORMANCE:  
RANGE OF MOTION:    
· AROM: Left ring finger motion is as follows: MP 0/75, PIP 0/100 , DIP 0/52 degrees. ·                                                         Little : MP 0/75, PIP -10/100, DIP 0/65 degrees. · The patient's left wrist flexion is limited: 25 degrees. STRENGTH:   STRENGTH: Right 54 lbs. Left 30 lbs. LAT PINCH: Right 12 lbs. Left 10 lbs. 3 JAW EVGENY: Right 10 lbs. Left 6 lbs. SENSATION:  The patient reports constant numbness in her left ring finger. Physical Skills Involved: 1. Range of Motion 2. Strength 3. Sensation 4. Pain (acute) 5. Edema Cognitive Skills Affected (resulting in the inability to perform in a timely and safe manner): 1. none Psychosocial Skills Affected: 1. none Number of elements that affect the Plan of Care: 5+:  HIGH COMPLEXITY CLINICAL DECISION MAKING:  
Outcome Measure: Tool Used: Disabilities of the Arm, Shoulder and Hand (DASH) Questionnaire - Quick Version Score:  Initial: 34/55  Most Recent: X/55 (Date: -- ) Interpretation of Score: The DASH is designed to measure the activities of daily living in person's with upper extremity dysfunction or pain.   Each section is scored on a 1-5 scale, 5 representing the greatest disability. The scores of each section are added together for a total score of 55. Score 11 12-19 20-28 29-37 38-45 46-54 55 Modifier CH CI CJ CK CL CM CN ? Carrying, Moving, and Handling Objects:  
  - CURRENT STATUS: CK - 40%-59% impaired, limited or restricted  - GOAL STATUS: CJ - 20%-39% impaired, limited or restricted  - D/C STATUS:  ---------------To be determined--------------- Medical Necessity:  
· Patient is expected to demonstrate progress in strength and range of motion to increase independence with ADL and household activities. Jacy De La O Reason for Services/Other Comments: · The patient has limited motion, strength and function in her left hand. Jacy De La O Clinical Decision-Making Assessment:    
Clinical Decision-Making: LOW COMPLEXITY  
TREATMENT:  
(In addition to Assessment/Re-Assessment sessions the following treatments were rendered) Pre-treatment Symptoms/Complaints:  Pain and stiffness in left hand . Pain: Initial:  
Pain Intensity 1: 2 Pain Location 1: Hand Pain Orientation 1: Left  Post Session:  1 Patient recieved a dry whirlpool ( 15 minutes) followed by instruction in a home exercise program. ( 15 minutes ) Patient stated \" I am doing better. Jacy De La O \" 
 
Manual Therapy: (Soft Tissue Mobilization Duration Duration: 15 Minutes Duration: 15 Minutes): Technique: Retrograde massage (followed by LIght tx & PROM) Tissue Mobilized: Scar/adhesion LUE Soft Tissue Mobilization: Yes Technique: Retrograde massage (followed by Light tx & PROM) Tissue Mobilized: Scar/adhesion Therapeutic Exercise:                                                                               : The patient's home exercise program was reviewed. Date: 
7/18/18 Date: 
7/23/18 Date: Date: Date: Activity/Exercise Parameters Parameters Parameters Parameters Parameters AROM during Fluidotherapy 20 min 15 min Paraffin with Stretch 15 min 15 min     
Retrograde massage, Friction Scar massage, Joint Mobilization 15 min 12 min Scarf Curl 5 min 2 min Washer Game 5 min 2 min Individual Gripper 20 reps 20 reps Hand [place] 20 reps 20 reps Cones Pegs Clothes Pins 25 reps 25 reps A-R [employer] Salud Sathish 40 reps Velcro-Roll A&PROM EX 5 min RESISTIVE EXERCISES Weight/ Sets/Reps Weight/ Sets/Reps Weight/ Sets/Reps Weight/ Sets/Reps Weight/ Sets/Reps WEIGHT WELL Sup/Pro       
UD/RD Wrist Flex/Ext Free Weights UBE(Minutes) Nautilus Compound Row Vertical Chest       
[employer] HEP: As above; handouts given to patient for all exercises. Therapeutic Modalities:  
      Left Wrist Heat Type: Paraffin bath (with a finger extension stretch) Duration: 15 minutes Patient Position: Sitting Joint Mobilization:     
 
Treatment Times: · Therapeutic Exercise: 15 minutes · Manual Therapy: 15 minutes · Parafin: 15 minutes · Whirlpool:  minutes · Other:  minutes Treatment/Session Assessment:   
· Response to Treatment:  Patients tolerated treatment well with no complications. Upon completion of treatment, skin condition was normal.. · Compliance with Program/Exercises: Will assess as treatment progresses. · Recommendations/Intent for next treatment session: \"Next visit will focus on advancements to more challenging activities\". Will continue per MD. Total Treatment Duration: OT Patient Time In/Time Out Time In: 1054 Time Out: 1100 Nelwyn Kussmaul, OT

## 2018-07-25 ENCOUNTER — HOSPITAL ENCOUNTER (OUTPATIENT)
Dept: PHYSICAL THERAPY | Age: 73
Discharge: HOME | End: 2018-07-25
Payer: MEDICARE

## 2018-07-25 PROCEDURE — 97018 PARAFFIN BATH THERAPY: CPT

## 2018-07-25 PROCEDURE — 97110 THERAPEUTIC EXERCISES: CPT

## 2018-07-25 PROCEDURE — 97140 MANUAL THERAPY 1/> REGIONS: CPT

## 2018-07-26 NOTE — PROGRESS NOTES
Richard Lyn : 1945 [place] Secondary:  [facility] [place] [address], [address], [address] [facility] [phone]   [phone] OUTPATIENT OCCUPATIONAL THERAPY: Daily Note 2018 ICD-10: Treatment Diagnosis: Pain in left hand (M79.642)Stiffness of left hand, not elsewhere classified (M25.642) Precautions/Allergies:  
Codeine Fall Risk Score: 0 (? 5 = High Risk) MD Orders: Evaluate and treat MEDICAL/REFERRING DIAGNOSIS:  
hand DATE OF ONSET: several months ago DATE OF SURGERY: May 11 2018 REFERRING PHYSICIAN: Estrella Barnard MD 
RETURN PHYSICIAN APPOINTMENT: 4 weeks INITIAL ASSESSMENT:  Ms. Salazar Alonso presents with decreased functional use, strength and range of motion of her left hand and upper extremity that is affecting her independence with activities of daily living and ability to perform job tasks. I feel that Ms. Lyn will benefit from skilled occupational therapy to maximize the functional use of her hand and upper extremity in daily activities. PLAN OF CARE:  
PROBLEM LIST: 
1. Pain in left hand. 2. Decreased motion in left hand. 3. Decreased strength in left hand. INTERVENTIONS PLANNED: 
1. Modalities that may include fluidotherapy, paraffin, ultrasound, and light therapy. 2. Therapeutic exercise including a home exercise program. 
3. Manual therapy. 4. Therapeutic activities. TREATMENT PLAN: 
Effective Dates: 2018 TO 10/15/2018 (90 days). Frequency/Duration: 1-2 times a week for 90 Days GOALS: (Goals have been discussed and agreed upon with patient.) Short-Term Functional Goals: Time Frame: 4 weeks 1. Decrease pain to 4 to allow patient to perform self care tasks. 2. Increase motion in left hand by 10 degrees to improve functional use of upper extremity in ADL activities.  
3. Increase strength in left hand by 5 pounds to allow patient to  and lift objects during self care activities. Discharge Goals: Time Frame: 12 weeks 1. Decrease pain to 1 to allow patient to perform all household  tasks. 2. Increase motion in left hand by 20 degreees to allow patient to perform all ADL activities. 3. Increase strength in left hand by 10 pounds to allow patient to , lift, hold, and carry heavy objects. Rehabilitation Potential For Stated Goals: Good Regarding Erlin Lyn's therapy, I certify that the treatment plan above will be carried out by a therapist or under their direction. Thank you for this referral, Edward Clark OT Referring Physician Signature: Yamil Vieyra MD _________________________  Date _________ The information in this section was collected on 7/16/2018 (except where otherwise noted). OCCUPATIONAL PROFILE & HISTORY:  
History of Present Injury/Illness (Reason for Referral): The patient had had symptoms of CT and trigger finger for several months. Past Medical History/Comorbidities: Ms. Danny Hardwick  has a past medical history of Arrhythmia; Diabetes ([address] (2003); GERD (gastroesophageal reflux disease); Hypercholesteremia; Hypertension; Osteopenia; Psychiatric disorder; Smoker; Unspecified adverse effect of anesthesia; Unspecified sleep apnea; and Wrist fracture, right (12/16/2010). She also has no past medical history of Difficult intubation; Malignant hyperthermia due to anesthesia; Nausea & vomiting; or Pseudocholinesterase deficiency. Ms. Danny Hardwick  has a past surgical history that includes hx orthopaedic (2005); hx heent (1998, 1996); hx lap cholecystectomy (2004); and hx tubal ligation (age35). Social History/Living Environment:  
  
Prior Level of Function/Work/Activity: 
independent Dominant Side:  
      RIGHT Current Medications:   
Current Outpatient Prescriptions:  
  HYDROcodone-acetaminophen (NORCO) 7.5-325 mg per tablet, Take 1 Tab by mouth every four (4) hours as needed for Pain.  Max Daily Amount: 6 Tabs., Disp: 20 Tab, Rfl: 0 
  metformin (GLUCOPHAGE) 500 mg tablet, Take 500 mg by mouth every morning., Disp: , Rfl:  
  rosuvastatin (CRESTOR) 20 mg tablet, Take 20 mg by mouth nightly., Disp: , Rfl:  
  citalopram (CELEXA) 20 mg tablet, Take 20 mg by mouth every morning., Disp: , Rfl:  
  MULTI-VITAMIN HI-PO PO, Take 1 Tab by mouth every morning., Disp: , Rfl:  
  fosinopril-hydrochlorothiazide (MONOPRIL HCT) 10-12.5 mg per tablet, Take 1 Tab by mouth every morning., Disp: , Rfl:   
Date Last Reviewed: 7/26/2018 Number of medical conditions (excluding presenting problem) that affect the Plan of Care: Brief history (0):  LOW COMPLEXITY ASSESSMENT OF OCCUPATIONAL PERFORMANCE:  
RANGE OF MOTION:    
· AROM: Left ring finger motion is as follows: MP 0/75, PIP 0/100 , DIP 0/52 degrees. ·                                                         Little : MP 0/75, PIP -10/100, DIP 0/65 degrees. · The patient's left wrist flexion is limited: 25 degrees. STRENGTH:   STRENGTH: Right 54 lbs. Left 30 lbs. LAT PINCH: Right 12 lbs. Left 10 lbs. 3 JAW EVGENY: Right 10 lbs. Left 6 lbs. SENSATION:  The patient reports constant numbness in her left ring finger. Physical Skills Involved: 1. Range of Motion 2. Strength 3. Sensation 4. Pain (acute) 5. Edema Cognitive Skills Affected (resulting in the inability to perform in a timely and safe manner): 1. none Psychosocial Skills Affected: 1. none Number of elements that affect the Plan of Care: 5+:  HIGH COMPLEXITY CLINICAL DECISION MAKING:  
Outcome Measure: Tool Used: Disabilities of the Arm, Shoulder and Hand (DASH) Questionnaire - Quick Version Score:  Initial: 34/55  Most Recent: X/55 (Date: -- ) Interpretation of Score: The DASH is designed to measure the activities of daily living in person's with upper extremity dysfunction or pain.   Each section is scored on a 1-5 scale, 5 representing the greatest disability. The scores of each section are added together for a total score of 55. Score 11 12-19 20-28 29-37 38-45 46-54 55 Modifier CH CI CJ CK CL CM CN ? Carrying, Moving, and Handling Objects:  
  - CURRENT STATUS: CK - 40%-59% impaired, limited or restricted  - GOAL STATUS: CJ - 20%-39% impaired, limited or restricted  - D/C STATUS:  ---------------To be determined--------------- Medical Necessity:  
· Patient is expected to demonstrate progress in strength and range of motion to increase independence with ADL and household activities. Darrel Feliz Reason for Services/Other Comments: · The patient has limited motion, strength and function in her left hand. Darrel Feliz Clinical Decision-Making Assessment:    
Clinical Decision-Making: LOW COMPLEXITY  
TREATMENT:  
(In addition to Assessment/Re-Assessment sessions the following treatments were rendered) Pre-treatment Symptoms/Complaints:  Pain and stiffness in left hand . Pain: Initial:  
Pain Intensity 1: 2 Pain Location 1: Hand Pain Orientation 1: Left  Post Session:  1 Patient recieved a dry whirlpool ( 15 minutes) followed by instruction in a home exercise program. ( 15 minutes ) Patient stated \" I am doing much better. Darrel Feliz \" 
 
Manual Therapy: (Soft Tissue Mobilization Duration Duration: 15 Minutes Duration: 15 Minutes): Technique: Retrograde massage (followed by Light  tx ,vibration and PROM) Tissue Mobilized: Scar/adhesion LUE Soft Tissue Mobilization: Yes Technique: Retrograde massage Therapeutic Exercise:                                                                               : The patient's home exercise program was reviewed. Date: 
7/18/18 Date: 
7/23/18 Date: 
7/25/18 Date: Date: Activity/Exercise Parameters Parameters Parameters Parameters Parameters AROM during Fluidotherapy 20 min 15 min 15 min Paraffin with Stretch 15 min 15 min 15 min Retrograde massage, Friction Scar massage, Joint Mobilization 15 min 12 min 13 min Scarf Curl 5 min 2 min 2 min Washer Game 5 min 2 min Individual Gripper 20 reps 20 reps 20 reps Hand [place] 20 reps 20 reps 20 reps Cones Pegs Clothes Pins 25 reps 25 reps 25 reps A-R [employer] Khushi Hides 40 reps  40 reps Velcro-Roll A&PROM EX 5 min RESISTIVE EXERCISES Weight/ Sets/Reps Weight/ Sets/Reps Weight/ Sets/Reps Weight/ Sets/Reps Weight/ Sets/Reps WEIGHT WELL Sup/Pro       
UD/RD Wrist Flex/Ext Free Weights UBE(Minutes) Nautilus Compound Row Vertical Chest       
[employer] HEP: As above; handouts given to patient for all exercises. Therapeutic Modalities:  
      Left Wrist Heat Type: Fluidotherapy (while perfoming AROM ex) Duration: 20 minutes Patient Position: Sitting Joint Mobilization:     
 
Treatment Times: · Therapeutic Exercise: 15 minutes · Manual Therapy: 15 minutes · Parafin: 15 minutes · Whirlpool:  minutes · Other:  minutes Treatment/Session Assessment:   
· Response to Treatment:  Patients tolerated treatment well with no complications. Upon completion of treatment, skin condition was normal.. · Compliance with Program/Exercises: Will assess as treatment progresses. · Recommendations/Intent for next treatment session: \"Next visit will focus on advancements to more challenging activities\". Will continue per MD. Total Treatment Duration: OT Patient Time In/Time Out Time In: 0100 Time Out: 3909 Mily Jain OT

## 2018-08-14 ENCOUNTER — HOSPITAL ENCOUNTER (OUTPATIENT)
Dept: PHYSICAL THERAPY | Age: 73
Discharge: HOME | End: 2018-08-14
Payer: MEDICARE

## 2018-08-14 PROCEDURE — G8986 CARRY D/C STATUS: HCPCS

## 2018-08-14 PROCEDURE — 97018 PARAFFIN BATH THERAPY: CPT

## 2018-08-14 PROCEDURE — 97140 MANUAL THERAPY 1/> REGIONS: CPT

## 2018-08-14 PROCEDURE — G8985 CARRY GOAL STATUS: HCPCS

## 2018-08-14 PROCEDURE — 97110 THERAPEUTIC EXERCISES: CPT

## 2018-08-14 NOTE — THERAPY DISCHARGE
Paulo Lyn  : 1945  [place]  Secondary:  [facility] [place] [address] [place] [address], [address] [facility]  [phone]   [phone]          OUTPATIENT OCCUPATIONAL THERAPY: Daily Note and Discharge 2018     ICD-10: Treatment Diagnosis: Pain in left hand (M79.642)Stiffness of left hand, not elsewhere classified (M25.642)  Precautions/Allergies:   Codeine   Fall Risk Score: 0 (? 5 = High Risk)  MD Orders: Evaluate and treat MEDICAL/REFERRING DIAGNOSIS:   hand   DATE OF ONSET: several months ago  DATE OF SURGERY: May 11 2018   REFERRING PHYSICIAN: Amandeep Jo MD  RETURN PHYSICIAN APPOINTMENT: 4 weeks     INITIAL ASSESSMENT:  Ms. Fidencio Dixon presents with decreased functional use, strength and range of motion of her left hand and upper extremity that is affecting her independence with activities of daily living and ability to perform job tasks. I feel that Ms. Lyn will benefit from skilled occupational therapy to maximize the functional use of her hand and upper extremity in daily activities. PLAN OF CARE:   PROBLEM LIST:  1. Pain in left hand. 2. Decreased motion in left hand. 3. Decreased strength in left hand. INTERVENTIONS PLANNED:  1. Modalities that may include fluidotherapy, paraffin, ultrasound, and light therapy. 2. Therapeutic exercise including a home exercise program.  3. Manual therapy. 4. Therapeutic activities. TREATMENT PLAN:  Effective Dates: 2018 TO 10/15/2018 (90 days). Frequency/Duration: 1-2 times a week for 90 Days  GOALS: (Goals have been discussed and agreed upon with patient.)  Short-Term Functional Goals: Time Frame: 4 weeks  1. Decrease pain to 4 to allow patient to perform self care tasks. ( GOAL MET )  2. Increase motion in left hand by 10 degrees to improve functional use of upper extremity in ADL activities. ( GOAL MET )  3.  Increase strength in left hand by 5 pounds to allow patient to  and lift objects during self care activities. ( GOAL MET )  Discharge Goals: Time Frame: 12 weeks  1. Decrease pain to 1 to allow patient to perform all household  Tasks. ( GOAL MET AT TIMES )  2. Increase motion in left hand by 20 degreees to allow patient to perform all ADL activities. ( GOAL MET )  3. Increase strength in left hand by 10 pounds to allow patient to , lift, hold, and carry heavy objects. ( GOAL MET )  Rehabilitation Potential For Stated Goals: Good  Regarding Jayro Nirav Lyn's therapy, I certify that the treatment plan above will be carried out by a therapist or under their direction. Thank you for this referral,  Sierra Dia OT       Referring Physician Signature: Abel Frazier MD _________________________  Date _________            The information in this section was collected on 7/16/2018 (except where otherwise noted). OCCUPATIONAL PROFILE & HISTORY:   History of Present Injury/Illness (Reason for Referral): The patient had had symptoms of CT and trigger finger for several months. Past Medical History/Comorbidities:   Ms. Nanda Zepeda  has a past medical history of Arrhythmia; Diabetes ([address] (2003); GERD (gastroesophageal reflux disease); Hypercholesteremia; Hypertension; Osteopenia; Psychiatric disorder; Smoker; Unspecified adverse effect of anesthesia; Unspecified sleep apnea; and Wrist fracture, right (12/16/2010). She also has no past medical history of Difficult intubation; Malignant hyperthermia due to anesthesia; Nausea & vomiting; or Pseudocholinesterase deficiency. Ms. Nanda Zepeda  has a past surgical history that includes hx orthopaedic (2005); hx heent (1998, 1996); hx lap cholecystectomy (2004); and hx tubal ligation (age35).   Social History/Living Environment:      Prior Level of Function/Work/Activity:  independent  Dominant Side:         RIGHT  Current Medications:    Current Outpatient Prescriptions:     HYDROcodone-acetaminophen (Julaine Kava) 7.5-325 mg per tablet, Take 1 Tab by mouth every four (4) hours as needed for Pain. Max Daily Amount: 6 Tabs., Disp: 20 Tab, Rfl: 0    metformin (GLUCOPHAGE) 500 mg tablet, Take 500 mg by mouth every morning., Disp: , Rfl:     rosuvastatin (CRESTOR) 20 mg tablet, Take 20 mg by mouth nightly., Disp: , Rfl:     citalopram (CELEXA) 20 mg tablet, Take 20 mg by mouth every morning., Disp: , Rfl:     MULTI-VITAMIN HI-PO PO, Take 1 Tab by mouth every morning., Disp: , Rfl:     fosinopril-hydrochlorothiazide (MONOPRIL HCT) 10-12.5 mg per tablet, Take 1 Tab by mouth every morning., Disp: , Rfl:    Date Last Reviewed: 8/14/2018    Number of medical conditions (excluding presenting problem) that affect the Plan of Care: Brief history (0):  LOW COMPLEXITY   ASSESSMENT OF OCCUPATIONAL PERFORMANCE:   RANGE OF MOTION:     · AROM: Left ring finger motion is now [facility]'  ·                                                         Little : [facility]. · The patient's left wrist flexion is now [facility]. Kaushik Spies STRENGTH:   STRENGTH: Right 54 lbs. Left 30 lbs. LAT PINCH: Right 12 lbs. Left 10 lbs. 3 JAW EVGENY: Right 10 lbs. Left 8 lbs. SENSATION:  The patient reports constant numbness in her left ring finger. Physical Skills Involved:  1. Range of Motion  2. Strength  3. Sensation  4. Pain (acute)  5. Edema Cognitive Skills Affected (resulting in the inability to perform in a timely and safe manner): 1. none Psychosocial Skills Affected:  1. none   Number of elements that affect the Plan of Care: 5+:  HIGH COMPLEXITY   CLINICAL DECISION MAKING:   Outcome Measure: Tool Used: Disabilities of the Arm, Shoulder and Hand (DASH) Questionnaire - Quick Version  Score:  Initial: 34/55  Most Recent: 17/55 (Date: 8/14/2018 )   Interpretation of Score: The DASH is designed to measure the activities of daily living in person's with upper extremity dysfunction or pain.   Each section is scored on a 1-5 scale, 5 representing the greatest disability. The scores of each section are added together for a total score of 55. Score 11 12-19 20-28 29-37 38-45 46-54 55   Modifier CH CI CJ CK CL CM CN     ? Carrying, Moving, and Handling Objects:     - CURRENT STATUS: CI - 1%-19% impaired, limited or restricted    - GOAL STATUS: CJ - 20%-39% impaired, limited or restricted    - D/C STATUS:  CI - 1%-19% impaired, limited or restricted    Medical Necessity:   · Patient is expected to demonstrate progress in strength and range of motion to increase independence with ADL and household activities. .  Reason for Services/Other Comments:  · The patient has limited motion, strength and function in her left hand. .  Clinical Decision-Making Assessment:     Clinical Decision-Making: LOW COMPLEXITY   TREATMENT:   (In addition to Assessment/Re-Assessment sessions the following treatments were rendered)  Pre-treatment Symptoms/Complaints:  Pain and stiffness in left hand . Pain: Initial:   Pain Intensity 1: 3  Pain Location 1: Hand  Pain Orientation 1: Left  Post Session:  1     Patient recieved a dry whirlpool ( 15 minutes) followed by instruction in a home exercise program. ( 15 minutes )  Patient stated \" I think I am ready for discharge. Jacy De La O \"    Manual Therapy: (Soft Tissue Mobilization Duration  Duration: 15 Minutes  Duration: 15 Minutes): Technique: Retrograde massage (followed by Light tx)  Tissue Mobilized: Scar/adhesion  LUE Soft Tissue Mobilization: Yes  Technique: Retrograde massage   Therapeutic Exercise:                                                                               : The patient's home exercise program was reviewed.                                                 Date:  7/18/18 Date:  7/23/18 Date:  7/25/18 Date:  8/14/18 Date:   Activity/Exercise Parameters Parameters Parameters Parameters Parameters   AROM during Fluidotherapy 20 min 15 min 15 min 15 min    Paraffin with Stretch 15 min 15 min 15 min 15 min    Retrograde massage, Friction Scar massage, Joint Mobilization   15 min 12 min 13 min 13 min  Light tx    Scarf Curl   5 min 2 min 2 min     Washer Game 5 min 2 min      Individual Gripper   20 reps 20 reps 20 reps     Hand [place]   20 reps 20 reps 20 reps     Cones          Putty      3 min    Clothes Pins   25 reps 25 reps 25 reps 25 reps    A-R Bar          Exerstick   40 reps  40 reps 40 reps    Velcro-Roll          A&PROM EX 5 min       RESISTIVE EXERCISES Weight/ Sets/Reps   Weight/ Sets/Reps Weight/ Sets/Reps Weight/ Sets/Reps Weight/ Sets/Reps   WEIGHT WELL        Sup/Pro        UD/RD        Wrist Flex/Ext        Free Weights          UBE(Minutes)          Nautilus        Compound Row        Vertical Chest        Overhead Press                    HEP: As above; handouts given to patient for all exercises. Therapeutic Modalities:         Left Wrist Heat  Type: Paraffin bath (with a finger extension stretch)  Duration: 15 minutes  Patient Position: Sitting                                     Joint Mobilization:        Treatment Times:  · Therapeutic Exercise: 15 minutes  · Manual Therapy: 15 minutes  · Parafin: 15 minutes  · Whirlpool:  minutes  · Other:  minutes       Treatment/Session Assessment:    · Response to Treatment:  Patients tolerated treatment well with no complications. Upon completion of treatment, skin condition was normal..  · Compliance with Program/Exercises: Will assess as treatment progresses. · Recommendations/Intent for next treatment session: \"To discharge at this time. All goals met.  \"  Total Treatment Duration:  OT Patient Time In/Time Out  Time In: 0300  Time Out: Zhen 6027, OT

## 2023-06-06 ENCOUNTER — OFFICE VISIT (OUTPATIENT)
Dept: ORTHOPEDIC SURGERY | Age: 78
End: 2023-06-06

## 2023-06-06 VITALS — BODY MASS INDEX: 25.76 KG/M2 | WEIGHT: 170 LBS | HEIGHT: 68 IN

## 2023-06-06 DIAGNOSIS — M50.30 DDD (DEGENERATIVE DISC DISEASE), CERVICAL: Primary | ICD-10-CM

## 2023-06-06 DIAGNOSIS — M54.2 NECK PAIN: ICD-10-CM

## 2023-06-06 RX ORDER — PREDNISONE 10 MG/1
TABLET ORAL
Qty: 48 TABLET | Refills: 0 | Status: SHIPPED | OUTPATIENT
Start: 2023-06-06

## 2023-06-06 NOTE — PROGRESS NOTES
[facility]  Consultation Note    Patient ID:  Name: Jerilyn Reyes  MRN: [id number]  AGE: 78 y.o.  : 1945    Date of Consultation:  2023    CC:   Chief Complaint   Patient presents with    New Patient    Neck Pain         HPI: This is a new patient visit on Jerilyn Reyes, she is a 79-year-old right-handed black female who is complaining of a several month history of neck pain and popping and cracking when she moves her head. Denies any radiating arm pain numbness tingling weakness. Injury she ever remembers was several years ago she dislocated the sternum no clavicular joint on the left does not hurt. Past Medical History Includes: She is a non-insulin-dependent diabetic but she does not have heart or lung disease she does have a history of breast cancer 5 years ago and she is not on blood thinners but she does smoke a pack per day.       Family History:   Family History   Problem Relation Age of Onset    Heart Disease Brother     Diabetes Mother     Cancer Mother       Social History:   Social History     Tobacco Use    Smoking status: Former     Packs/day: 0.50     Types: Cigarettes     Start date: 3/20/2018    Smokeless tobacco: Never   Substance Use Topics    Alcohol use: No       ALLERGIES:   Allergies   Allergen Reactions    Varenicline      Other reaction(s): GI intolerance, Nausea and/or vomiting-Intolerance    Codeine Nausea And Vomiting     Other reaction(s): GI intolerance, Nausea and/or vomiting-Intolerance        Patient Medications    Current Outpatient Medications   Medication Sig    fosinopril-hydroCHLOROthiazide (MONOPRIL-HCT) 10-12.5 MG per tablet Take 1 tablet by mouth    metFORMIN (GLUCOPHAGE) 500 MG tablet Take 1 tablet by mouth    rosuvastatin (CRESTOR) 20 MG tablet Take 1 tablet by mouth    citalopram (CELEXA) 20 MG tablet Take 20 mg by mouth    HYDROcodone-acetaminophen (NORCO) 7.5-325 MG per tablet Take 1 tablet by mouth every 4

## (undated) DEVICE — SURGICAL PROCEDURE PACK BASIC ST FRANCIS

## (undated) DEVICE — INSTRUMENT ORTH L6IN LNG S STL SGL IN TOME

## (undated) DEVICE — SYR 10ML CTRL LR LCK NSAF LF --

## (undated) DEVICE — DRAPE,HAND,STERILE: Brand: MEDLINE

## (undated) DEVICE — NEEDLE HYPO 18GA L1.5IN PNK S STL HUB POLYPR SHLD REG BVL

## (undated) DEVICE — AMD ANTIMICROBIAL GAUZE SPONGES,12 PLY USP TYPE VII, 0.2% POLYHEXAMETHYLENE BIGUANIDE HCI (PHMB): Brand: CURITY

## (undated) DEVICE — ZIMMER® STERILE DISPOSABLE TOURNIQUET CUFF WITH PLC, DUAL PORT, SINGLE BLADDER, 18 IN. (46 CM)

## (undated) DEVICE — STRETCH BANDAGE ROLL: Brand: DERMACEA

## (undated) DEVICE — BNDG SOF-FORM 2X75 STRL LF --

## (undated) DEVICE — BANDAGE COMPR 9 FTX4 IN SMOOTH COMFORTABLE SYNTH ESMRK LF

## (undated) DEVICE — (D)PREP SKN CHLRAPRP APPL 26ML -- CONVERT TO ITEM 371833

## (undated) DEVICE — REM POLYHESIVE ADULT PATIENT RETURN ELECTRODE: Brand: VALLEYLAB

## (undated) DEVICE — Device

## (undated) DEVICE — SOLUTION IV 1000ML 0.9% SOD CHL

## (undated) DEVICE — APPLICATOR BNDG 1MM ADH PREMIERPRO EXOFIN

## (undated) DEVICE — SUTURE VCRL RAPIDE SZ 4-0 L18IN ABSRB UD L19MM PS-2 1/2 CIR VR496